# Patient Record
Sex: MALE | Race: WHITE | Employment: FULL TIME | URBAN - METROPOLITAN AREA
[De-identification: names, ages, dates, MRNs, and addresses within clinical notes are randomized per-mention and may not be internally consistent; named-entity substitution may affect disease eponyms.]

---

## 2021-12-06 ENCOUNTER — HOSPITAL ENCOUNTER (EMERGENCY)
Age: 51
Discharge: HOME | End: 2021-12-06
Payer: MEDICAID

## 2021-12-06 VITALS
DIASTOLIC BLOOD PRESSURE: 105 MMHG | HEART RATE: 91 BPM | TEMPERATURE: 98.7 F | RESPIRATION RATE: 18 BRPM | OXYGEN SATURATION: 98 % | SYSTOLIC BLOOD PRESSURE: 166 MMHG | WEIGHT: 200 LBS | BODY MASS INDEX: 28.63 KG/M2 | HEIGHT: 70 IN

## 2021-12-06 DIAGNOSIS — Z23 NEED FOR TETANUS BOOSTER: ICD-10-CM

## 2021-12-06 DIAGNOSIS — S61.216A LACERATION OF RIGHT LITTLE FINGER WITHOUT FOREIGN BODY WITHOUT DAMAGE TO NAIL, INITIAL ENCOUNTER: Primary | ICD-10-CM

## 2021-12-06 PROCEDURE — 90715 TDAP VACCINE 7 YRS/> IM: CPT | Performed by: PHYSICIAN ASSISTANT

## 2021-12-06 PROCEDURE — 6360000002 HC RX W HCPCS: Performed by: PHYSICIAN ASSISTANT

## 2021-12-06 PROCEDURE — 99283 EMERGENCY DEPT VISIT LOW MDM: CPT

## 2021-12-06 PROCEDURE — 90471 IMMUNIZATION ADMIN: CPT | Performed by: PHYSICIAN ASSISTANT

## 2021-12-06 PROCEDURE — 12001 RPR S/N/AX/GEN/TRNK 2.5CM/<: CPT

## 2021-12-06 RX ORDER — CEPHALEXIN 500 MG/1
500 CAPSULE ORAL 4 TIMES DAILY
Qty: 12 CAPSULE | Refills: 0 | Status: SHIPPED | OUTPATIENT
Start: 2021-12-06 | End: 2021-12-09

## 2021-12-06 RX ADMIN — TETANUS TOXOID, REDUCED DIPHTHERIA TOXOID AND ACELLULAR PERTUSSIS VACCINE, ADSORBED 0.5 ML: 5; 2.5; 8; 8; 2.5 SUSPENSION INTRAMUSCULAR at 13:31

## 2021-12-06 NOTE — ED NOTES
AVS printed and given to patient along with prescriptions. Patient verbalized understanding. Patient denied having any concerns or complaints at this time. Patient leaving department in stable condition.          Damien Knight RN  12/06/21 9448

## 2021-12-06 NOTE — ED PROVIDER NOTES
Emergency [address] [facility]  ED    Patient: Isadora Palacio  WRS:[id number]  : 1970  Date of Evaluation: 2021  ED LOYDA Provider: RUCHI Moore    Chief Complaint       Chief Complaint   Patient presents with    Laceration     Right hand pinky finger. occured at approx 1130 today. Cut at construction site. Last TDaP 30 years ago. GLENDA     I was wearing a surgical mask for the entirety of this encounter. Does this patient come from an ECF, SNF, Rehab, [address] [place] [address] or other Congregate setting: No  (If yes to above patient needs a Covid-19 test)    Isadora Palacio is a 54 y.o. male who presents to the emergency department for evaluation of laceration to the right hand specifically the base of the lateral aspect of his right pinky finger. 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 Patient complains of mild 2 out of 10 burning pain with some mild numbness localized to the lateral aspect of his right hand. On physical exam he is able to fully flex and extend at the fingers he is able to oppose his small finger to his thumb and all other fingers to his thumb as well on his affected right hand he has for the most part intact two-point sensation is somewhat mildly numb to the lateral aspect of that right distal hand however this is likely secondary to superficial nerve disruption and pain he denies any other radiation aggravating relieving factors x1 day    ROS:     Review of Systems right hand laceration as above  At least 10 systemsreviewed and otherwise acutely negative except as in the [address] Past History   History reviewed. No pertinent past medical history. History reviewed. No pertinent surgical history.   Social History     Socioeconomic History    Marital status:      Spouse name: None    Number of children: None    Years of education: None    Highest education level: None   Occupational History    None   Tobacco Use    Smoking status: Current Some Day Smoker     Types: Cigars    Smokeless tobacco: Never Used    Tobacco comment: occasional   Vaping Use    Vaping Use: Never used   Substance and Sexual Activity    Alcohol use: Yes     Comment: socially    Drug use: Never    Sexual activity: None   Other Topics Concern    None   Social History Narrative    None     Social Determinants of Health     Financial Resource Strain:     Difficulty of Paying Living Expenses: Not on file   Food Insecurity:     Worried About Running Out of Food in the Last Year: Not on file    Manoj of Food in the Last Year: Not on file   Transportation Needs:     Lack of Transportation (Medical): Not on file    Lack of Transportation (Non-Medical):  Not on file   Physical Activity:     Days of Exercise per Week: Not on file    Minutes of Exercise per Session: Not on file   Stress:     Feeling of Stress : Not on file   Social Connections:     Frequency of Communication with Friends and Family: Not on file    Frequency of Social Gatherings with Friends and Family: Not on file    Attends [employer] Services: Not on file    Active Member of [address] [place] [address] [employer] or Organizations: Not on file    Attends Club or Organization Meetings: Not on file    Marital Status: Not on file   Intimate Partner Violence:     Fear of Current or Ex-Partner: Not on file    Emotionally Abused: Not on file    Physically Abused: Not on file    Sexually Abused: Not on file   Housing Stability:     Unable to Pay for Housing in the Last Year: Not on file    Number of [place] in the Last Year: Not on file    Unstable Housing in the Last Year: Not on file       Medications/Allergies     Previous Medications    No medications on file     No Known Allergies     Physical Exam       ED Triage Vitals   BP Temp Temp Source Pulse Resp SpO2 Height Weight   12/06/21 1241 12/06/21 1239 12/06/21 1239 12/06/21 1239 12/06/21 1239 12/06/21 1239 12/06/21 1239 12/06/21 1239   (!) 166/105 98.7 °F (37.1 °C) Oral 91 18 98 % 5' 10\" (1.778 m) 200 lb (90.7 kg)     Physical Exam  Noted hypertension non-tachycardic afebrile nontachypneic satting well on room air  Constitutional:  Well-nourished well-developed in no acute distress  Eyes:  PERRLA, EOMI x6, no nystagmus no photophobia  HENT:  Teeth and tongue intact, uvula midline, no PTA or retropharyngeal abscess noted no other intraoral lesion or edema appreciated patient tolerating secretions well, no stridor, no nuchal rigidity  Respiratory:  Clear to auscultation bilaterally, no crepitus or subcutaneous emphysema noted with palpation in the bilateral chest wall  Cardiovascular:  S1-S2, no S3  GI:  Abdomen soft nontender nondistended  :  Exam deferred for now no subjective complaints  Musculoskeletal:  Distally neurovascularly intact 2+ pulses normal capillary refill gross sensorimotor intact ×4 extremitiesable to fully flex and extend at the fingers he is able to oppose his small finger to his thumb and all other fingers to his thumb as well on his affected right hand he has for the most part intact two-point sensation is somewhat mildly numb to the lateral aspect of that right distal hand however this is likely secondary to superficial nerve disruption and pain  Integument:  Skin is warm well perfused mostly hemostatic laceration to the lateral aspect of the base of his right fifth finger as above  Lymphatic:  No significant lymphadenopathy appreciated  Neurologic:  Alert and oriented ×3, cranial nerves II through XII grossly intact as tested, patient able to ambulate, no ataxia, cauda equina, saddle anesthesia or bowel or bladder incontinence  Psychiatric:  Mood, behavior, judgment all within normal limits      SCREENINGS           Diagnostics   Labs:  No results found for this visit on 12/06/21.   Radiographs:  No results found. Procedures:   Procedure Note - Laceration repair:  Questions were sought and answered and verbal consent was given by patient for the procedure. The area was prepped and draped in standard bedside fashion. The wound area was anesthetized with 2ml of Lidocaine 2% without epinephrine without added sodium bicarbonate. The wound was explored with No foreign bodies found. The wound was repaired with 4-0 Ethilon; 4  sutures were used. The patient tolerated the procedure well without complications and my repeat neurovascular exam post-procedure is unchanged. Wound care and scar minimization education was provided. Instructions were given to return for increasing pain, redness, streaking, discharge, or any other worsening or worrisome concerns. ED Course and MDM           In brief, Shelbi Grady is a 54 y.o. male who presented to the emergency department for evaluation of right hand laceration secondary to injury he sustained while at a local worksite. Patient had his tetanus status updated at this visit    Patient underwent laceration repair as above tolerated procedure very well. Patient is an out-of-town resident and is returning today to [place] will have his primary care provider evaluate him in 10 days time for subsequent wound check and suture removal at that time    Patient distracted on treatment care plan and verbalized understanding    ED Medication Orders (From admission, onward)    Start Ordered     Status Ordering Provider    12/06/21 1300 12/06/21 1251  Tetanus-Diphth-Acell Pertussis (BOOSTRIX) injection 0.5 mL  ONCE         Last MAR action: Given - by Cecily Rainey on 12/06/21 at [facility], [facility]          Final Impression      1. Laceration of right little finger without foreign body without damage to nail, initial encounter    2.  Need for tetanus booster        DISPOSITION Decision To Discharge 12/06/2021 01:26:15 PM     Patient seen independently with an Emergency Medicine attending available for supervision.     (Please note that portions of this note may have been completed with a voice recognition program. Efforts were made to edit the dictations but occasionally words aremis-transcribed.)    Rafael Cespedes, [address] [place], [place]  12/06/21 9283